# Patient Record
Sex: FEMALE | Race: WHITE | NOT HISPANIC OR LATINO | Employment: FULL TIME | URBAN - METROPOLITAN AREA
[De-identification: names, ages, dates, MRNs, and addresses within clinical notes are randomized per-mention and may not be internally consistent; named-entity substitution may affect disease eponyms.]

---

## 2020-08-07 ENCOUNTER — TRANSCRIBE ORDERS (OUTPATIENT)
Dept: ADMINISTRATIVE | Facility: HOSPITAL | Age: 65
End: 2020-08-07

## 2020-08-07 DIAGNOSIS — Z12.31 SCREENING MAMMOGRAM, ENCOUNTER FOR: Primary | ICD-10-CM

## 2020-08-21 ENCOUNTER — HOSPITAL ENCOUNTER (OUTPATIENT)
Dept: RADIOLOGY | Facility: CLINIC | Age: 65
Discharge: HOME | End: 2020-08-21
Payer: COMMERCIAL

## 2020-08-21 VITALS — WEIGHT: 185 LBS | BODY MASS INDEX: 32.78 KG/M2 | HEIGHT: 63 IN

## 2020-08-21 DIAGNOSIS — Z12.31 SCREENING MAMMOGRAM, ENCOUNTER FOR: ICD-10-CM

## 2020-08-21 PROCEDURE — 77063 BREAST TOMOSYNTHESIS BI: CPT

## 2020-08-21 PROCEDURE — 77067 SCR MAMMO BI INCL CAD: CPT

## 2020-09-09 ENCOUNTER — TRANSCRIBE ORDERS (OUTPATIENT)
Dept: ADMINISTRATIVE | Facility: HOSPITAL | Age: 65
End: 2020-09-09

## 2020-09-09 DIAGNOSIS — R92.8 ABNORMAL MAMMOGRAM: Primary | ICD-10-CM

## 2020-09-16 ENCOUNTER — HOSPITAL ENCOUNTER (OUTPATIENT)
Dept: RADIOLOGY | Facility: CLINIC | Age: 65
Discharge: HOME | End: 2020-09-16
Payer: COMMERCIAL

## 2020-09-16 VITALS — HEIGHT: 63 IN | BODY MASS INDEX: 32.78 KG/M2 | WEIGHT: 185 LBS

## 2020-09-16 DIAGNOSIS — R92.8 ABNORMAL MAMMOGRAM: ICD-10-CM

## 2020-09-16 PROCEDURE — 76642 ULTRASOUND BREAST LIMITED: CPT

## 2020-12-08 ENCOUNTER — NURSE TRIAGE (OUTPATIENT)
Dept: OTHER | Facility: OTHER | Age: 65
End: 2020-12-08

## 2020-12-08 DIAGNOSIS — Z20.828 SARS-ASSOCIATED CORONAVIRUS EXPOSURE: ICD-10-CM

## 2020-12-08 DIAGNOSIS — Z20.828 SARS-ASSOCIATED CORONAVIRUS EXPOSURE: Primary | ICD-10-CM

## 2020-12-08 PROCEDURE — U0003 INFECTIOUS AGENT DETECTION BY NUCLEIC ACID (DNA OR RNA); SEVERE ACUTE RESPIRATORY SYNDROME CORONAVIRUS 2 (SARS-COV-2) (CORONAVIRUS DISEASE [COVID-19]), AMPLIFIED PROBE TECHNIQUE, MAKING USE OF HIGH THROUGHPUT TECHNOLOGIES AS DESCRIBED BY CMS-2020-01-R: HCPCS | Performed by: FAMILY MEDICINE

## 2020-12-10 LAB — SARS-COV-2 RNA SPEC QL NAA+PROBE: NOT DETECTED

## 2021-09-03 ENCOUNTER — HOSPITAL ENCOUNTER (OUTPATIENT)
Dept: RADIOLOGY | Facility: CLINIC | Age: 66
Discharge: HOME | End: 2021-09-03
Payer: COMMERCIAL

## 2021-09-03 VITALS — WEIGHT: 185 LBS | BODY MASS INDEX: 32.78 KG/M2 | HEIGHT: 63 IN

## 2021-09-03 DIAGNOSIS — Z12.31 ENCOUNTER FOR SCREENING MAMMOGRAM FOR MALIGNANT NEOPLASM OF BREAST: ICD-10-CM

## 2021-09-03 PROCEDURE — 77063 BREAST TOMOSYNTHESIS BI: CPT

## 2021-09-03 PROCEDURE — 77067 SCR MAMMO BI INCL CAD: CPT

## 2021-09-29 ENCOUNTER — HOSPITAL ENCOUNTER (OUTPATIENT)
Dept: RADIOLOGY | Facility: CLINIC | Age: 66
Discharge: HOME | End: 2021-09-29
Payer: COMMERCIAL

## 2021-09-29 VITALS — BODY MASS INDEX: 32.78 KG/M2 | HEIGHT: 63 IN | WEIGHT: 185 LBS

## 2021-09-29 DIAGNOSIS — R92.8 ABNORMAL SCREENING MAMMOGRAM: ICD-10-CM

## 2021-09-29 PROCEDURE — 77065 DX MAMMO INCL CAD UNI: CPT

## 2021-09-29 PROCEDURE — G0279 TOMOSYNTHESIS, MAMMO: HCPCS

## 2021-09-29 PROCEDURE — 76642 ULTRASOUND BREAST LIMITED: CPT

## 2021-12-27 ENCOUNTER — HOSPITAL ENCOUNTER (OUTPATIENT)
Dept: RADIOLOGY | Facility: CLINIC | Age: 66
Discharge: HOME | End: 2021-12-27
Payer: COMMERCIAL

## 2021-12-27 DIAGNOSIS — Z13.820 SCREENING FOR OSTEOPOROSIS: ICD-10-CM

## 2021-12-27 DIAGNOSIS — Z78.0 POSTMENOPAUSAL: ICD-10-CM

## 2021-12-27 PROCEDURE — 77080 DXA BONE DENSITY AXIAL: CPT

## 2022-03-23 ENCOUNTER — HOSPITAL ENCOUNTER (OUTPATIENT)
Dept: RADIOLOGY | Facility: CLINIC | Age: 67
Discharge: HOME | End: 2022-03-23
Payer: COMMERCIAL

## 2022-03-23 VITALS — HEIGHT: 63 IN | BODY MASS INDEX: 32.78 KG/M2 | WEIGHT: 185 LBS

## 2022-03-23 DIAGNOSIS — R92.8 OTHER ABNORMAL AND INCONCLUSIVE FINDINGS ON DIAGNOSTIC IMAGING OF BREAST: ICD-10-CM

## 2022-03-23 PROCEDURE — 77066 DX MAMMO INCL CAD BI: CPT

## 2022-03-23 PROCEDURE — 76642 ULTRASOUND BREAST LIMITED: CPT

## 2022-03-23 PROCEDURE — G0279 TOMOSYNTHESIS, MAMMO: HCPCS

## 2022-10-23 ENCOUNTER — HOSPITAL ENCOUNTER (EMERGENCY)
Facility: HOSPITAL | Age: 67
Discharge: HOME | End: 2022-10-23
Attending: STUDENT IN AN ORGANIZED HEALTH CARE EDUCATION/TRAINING PROGRAM
Payer: COMMERCIAL

## 2022-10-23 VITALS
TEMPERATURE: 97.2 F | HEART RATE: 65 BPM | DIASTOLIC BLOOD PRESSURE: 89 MMHG | RESPIRATION RATE: 18 BRPM | OXYGEN SATURATION: 100 % | SYSTOLIC BLOOD PRESSURE: 139 MMHG

## 2022-10-23 DIAGNOSIS — S60.463A INSECT BITE OF LEFT MIDDLE FINGER, INITIAL ENCOUNTER: ICD-10-CM
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
## 2022-10-23 DIAGNOSIS — L03.012 CELLULITIS OF LEFT MIDDLE FINGER: Primary | ICD-10-CM

## 2022-10-23 PROCEDURE — 99284 EMERGENCY DEPT VISIT MOD MDM: CPT | Performed by: STUDENT IN AN ORGANIZED HEALTH CARE EDUCATION/TRAINING PROGRAM

## 2022-10-23 RX ORDER — CEPHALEXIN 250 MG/1
500 CAPSULE ORAL ONCE
Status: COMPLETED | OUTPATIENT
Start: 2022-10-23 | End: 2022-10-23

## 2022-10-23 RX ORDER — CEPHALEXIN 500 MG/1
500 CAPSULE ORAL EVERY 6 HOURS SCHEDULED
Qty: 19 CAPSULE | Refills: 0 | Status: SHIPPED | OUTPATIENT
Start: 2022-10-23 | End: 2022-10-28

## 2022-10-23 RX ADMIN — CEPHALEXIN 500 MG: 250 CAPSULE ORAL at 12:10

## 2022-10-23 NOTE — DISCHARGE INSTRUCTIONS
You are being prescribed a short course of antibiotics for treatment of a soft tissue infection  Please take as directed  In addition, you can take Motrin 600 mg every 6 hours along with oral Benadryl 25 mg every 6-8 hours  You can also apply cool compresses  Follow up with your PCP  Do not hesitate to be re-evaluated in the ED for any concerning signs or symptoms

## 2022-10-23 NOTE — ED PROVIDER NOTES
History  Chief Complaint   Patient presents with   • Insect Bite     Patient stung by bee on Friday  Patient has increased swelling and redness on left middle digit  History provided by:  Patient  Hand Pain  Location:  Left middle finger  Quality:  Pressure  Severity:  Moderate  Onset quality:  Gradual  Duration:  2 days  Timing:  Constant  Progression:  Worsening  Chronicity:  New  Context:  Was stung by an insect  Relieved by:  Nothing  Worsened by:  Nothing  Ineffective treatments:  Antihistamines  Associated symptoms: no fever, no headaches, no myalgias and no rash      30-year-old female  Presents to the ED with increased redness/swelling/pain along the volar aspect of her right middle finger  Was stung on the finger by an insect 48 hours prior  Thus far, took two doses of Benadryl and a dose of Zyrtec without relief  Denies fevers/chills  History reviewed  No pertinent past medical history  History reviewed  No pertinent surgical history  Family History   Problem Relation Age of Onset   • Breast cancer Mother 54   • No Known Problems Father    • No Known Problems Sister    • No Known Problems Maternal Grandmother    • No Known Problems Maternal Grandfather    • No Known Problems Paternal Grandmother    • No Known Problems Paternal Grandfather    • No Known Problems Sister    • No Known Problems Sister    • No Known Problems Sister    • Breast cancer Paternal Aunt    • Breast cancer Paternal Aunt    • BRCA2 Positive Neg Hx    • BRCA1 Positive Neg Hx    • BRCA2 Negative Neg Hx    • BRCA1 Negative Neg Hx    • Ovarian cancer Neg Hx    • Colon cancer Neg Hx    • BRCA 1/2 Neg Hx    • Endometrial cancer Neg Hx    • Breast cancer additional onset Neg Hx      I have reviewed and agree with the history as documented      E-Cigarette/Vaping   • E-Cigarette Use Never User      E-Cigarette/Vaping Substances     Social History     Tobacco Use   • Smoking status: Never Smoker   • Smokeless tobacco: Never Used Vaping Use   • Vaping Use: Never used   Substance Use Topics   • Alcohol use: Yes   • Drug use: Not Currently     Review of Systems   Constitutional: Negative for activity change, appetite change, chills and fever  Musculoskeletal: Positive for joint swelling  Negative for arthralgias, back pain, gait problem and myalgias  Skin: Positive for color change and wound  Negative for pallor and rash  Neurological: Negative for dizziness, syncope, weakness, light-headedness and headaches  Hematological: Does not bruise/bleed easily  All other systems reviewed and are negative  Physical Exam  Physical Exam  Vitals and nursing note reviewed  Constitutional:       General: She is not in acute distress  Appearance: She is not ill-appearing or toxic-appearing  HENT:      Head: Normocephalic and atraumatic  Right Ear: External ear normal       Left Ear: External ear normal    Cardiovascular:      Rate and Rhythm: Normal rate and regular rhythm  Pulses: Normal pulses  Heart sounds: Normal heart sounds  No murmur heard  Pulmonary:      Effort: Pulmonary effort is normal  No respiratory distress  Breath sounds: Normal breath sounds  No stridor  No wheezing, rhonchi or rales  Chest:      Chest wall: No tenderness  Abdominal:      General: Bowel sounds are normal       Tenderness: There is no abdominal tenderness  There is no right CVA tenderness, left CVA tenderness, guarding or rebound  Musculoskeletal:         General: Swelling, tenderness and signs of injury present  Hands:       Comments: Tenderness to palpation along the volar aspect of the left distal 3rd digit  The soft tissue is warm/erythematous/painful to touch  Normal capillary refill  The left hand is neurovascularly intact  No signs of a stinger  Skin:     General: Skin is warm and dry  Capillary Refill: Capillary refill takes less than 2 seconds  Coloration: Skin is not jaundiced or pale  Findings: Erythema present  No bruising, lesion or rash  Neurological:      General: No focal deficit present  Mental Status: She is alert and oriented to person, place, and time  Mental status is at baseline  Cranial Nerves: No cranial nerve deficit  Sensory: No sensory deficit  Motor: No weakness  Psychiatric:         Mood and Affect: Mood normal          Behavior: Behavior normal          Thought Content: Thought content normal          Judgment: Judgment normal          Vital Signs  ED Triage Vitals [10/23/22 1131]   Temperature Pulse Respirations Blood Pressure SpO2   (!) 97 2 °F (36 2 °C) 65 18 139/89 100 %      Temp Source Heart Rate Source Patient Position - Orthostatic VS BP Location FiO2 (%)   Temporal Monitor Sitting Right arm --      Pain Score       --           Vitals:    10/23/22 1131   BP: 139/89   Pulse: 65   Patient Position - Orthostatic VS: Sitting     ED Medications  Medications   cephalexin (KEFLEX) capsule 500 mg (500 mg Oral Given 10/23/22 1210)     Diagnostic Studies  Results Reviewed     None             No orders to display          Procedures  Procedures     ED Course  ED Course as of 10/23/22 1311   Sun Oct 23, 2022   1308 Given the duration and worsening of the patient's symptoms, will prescribe a course of Keflex for treatment of cellulitis  No signs of flexor tenosynovitis  In addition to the abx, OTC medications such as NSAIDS, benadryl recommended  Return precautions discussed and PCP follow up recommended  The patient was stable for discharge        MDM    Disposition  Final diagnoses:   Cellulitis of left middle finger   Insect bite of left middle finger, initial encounter     Time reflects when diagnosis was documented in both MDM as applicable and the Disposition within this note     Time User Action Codes Description Comment    10/23/2022 12:06 PM [facility] Add [H05 [id number]] Cellulitis of left middle finger     10/23/2022 12:06 PM Pastor Burentt Reynaldo Oscar  XXXA] Insect bite of left middle finger, initial encounter       ED Disposition     ED Disposition   Discharge    Condition   Stable    Date/Time   Sun Oct 23, 2022 12:09 PM    Comment   Glory Limb discharge to home/self care  Follow-up Information    None         Discharge Medication List as of 10/23/2022 12:09 PM      START taking these medications    Details   cephalexin (KEFLEX) 500 mg capsule Take 1 capsule (500 mg total) by mouth every 6 (six) hours for 5 days, Starting Sun 10/23/2022, Until Fri 10/28/2022, Normal             No discharge procedures on file      PDMP Review     None          ED Provider  Electronically Signed by           Dora Ward DO  10/23/22 1311

## 2023-03-31 ENCOUNTER — HOSPITAL ENCOUNTER (OUTPATIENT)
Dept: RADIOLOGY | Facility: CLINIC | Age: 68
End: 2023-03-31

## 2023-03-31 VITALS — BODY MASS INDEX: 32.2 KG/M2 | WEIGHT: 175 LBS | HEIGHT: 62 IN

## 2023-03-31 DIAGNOSIS — Z12.31 ENCOUNTER FOR SCREENING MAMMOGRAM FOR MALIGNANT NEOPLASM OF BREAST: ICD-10-CM

## 2023-12-28 ENCOUNTER — HOSPITAL ENCOUNTER (OUTPATIENT)
Dept: RADIOLOGY | Facility: CLINIC | Age: 68
End: 2023-12-28
Payer: COMMERCIAL

## 2023-12-28 DIAGNOSIS — Z78.0 ASYMPTOMATIC MENOPAUSAL STATE: ICD-10-CM

## 2023-12-28 PROCEDURE — 77080 DXA BONE DENSITY AXIAL: CPT

## 2024-01-24 ENCOUNTER — DOCTOR'S OFFICE (OUTPATIENT)
Dept: URBAN - NONMETROPOLITAN AREA CLINIC 1 | Facility: CLINIC | Age: 69
Setting detail: OPHTHALMOLOGY
End: 2024-01-24
Payer: COMMERCIAL

## 2024-01-24 DIAGNOSIS — H34.8120: ICD-10-CM

## 2024-01-24 DIAGNOSIS — H35.372: ICD-10-CM

## 2024-01-24 DIAGNOSIS — S09.90XA: ICD-10-CM

## 2024-01-24 DIAGNOSIS — H25.13: ICD-10-CM

## 2024-01-24 DIAGNOSIS — H43.811: ICD-10-CM

## 2024-01-24 PROCEDURE — 92134 CPTRZ OPH DX IMG PST SGM RTA: CPT | Performed by: OPHTHALMOLOGY

## 2024-01-24 PROCEDURE — 99203 OFFICE O/P NEW LOW 30 MIN: CPT | Performed by: OPHTHALMOLOGY

## 2024-01-24 ASSESSMENT — REFRACTION_AUTOREFRACTION
OD_AXIS: 055
OD_CYLINDER: -0.25
OS_SPHERE: -7.75
OD_SPHERE: -7.25

## 2024-01-24 ASSESSMENT — CONFRONTATIONAL VISUAL FIELD TEST (CVF)
OS_FINDINGS: FULL
OD_FINDINGS: FULL

## 2024-01-24 ASSESSMENT — SPHEQUIV_DERIVED: OD_SPHEQUIV: -7.375

## 2024-04-02 ENCOUNTER — HOSPITAL ENCOUNTER (OUTPATIENT)
Dept: RADIOLOGY | Facility: CLINIC | Age: 69
Discharge: HOME | End: 2024-04-02
Payer: COMMERCIAL

## 2024-04-02 VITALS — HEIGHT: 63 IN | BODY MASS INDEX: 31.01 KG/M2 | WEIGHT: 175 LBS

## 2024-04-02 DIAGNOSIS — Z12.31 ENCOUNTER FOR SCREENING MAMMOGRAM FOR MALIGNANT NEOPLASM OF BREAST: ICD-10-CM

## 2024-04-02 PROCEDURE — 77063 BREAST TOMOSYNTHESIS BI: CPT

## 2024-04-02 PROCEDURE — 77067 SCR MAMMO BI INCL CAD: CPT

## 2025-02-22 ENCOUNTER — HOSPITAL ENCOUNTER (EMERGENCY)
Facility: HOSPITAL | Age: 70
Discharge: HOME | End: 2025-02-22
Attending: EMERGENCY MEDICINE
Payer: MEDICARE

## 2025-02-22 ENCOUNTER — APPOINTMENT (EMERGENCY)
Dept: CT IMAGING | Facility: HOSPITAL | Age: 70
End: 2025-02-22
Payer: MEDICARE

## 2025-02-22 VITALS
DIASTOLIC BLOOD PRESSURE: 63 MMHG | HEART RATE: 64 BPM | HEIGHT: 62 IN | SYSTOLIC BLOOD PRESSURE: 140 MMHG | OXYGEN SATURATION: 96 % | WEIGHT: 175 LBS | RESPIRATION RATE: 17 BRPM | TEMPERATURE: 97.5 F | BODY MASS INDEX: 32.2 KG/M2

## 2025-02-22 DIAGNOSIS — R22.2 SUPRACLAVICULAR FOSSA FULLNESS: Primary | ICD-10-CM

## 2025-02-22 LAB
ALBUMIN SERPL BCG-MCNC: 4.1 G/DL (ref 3.5–5)
ALP SERPL-CCNC: 97 U/L (ref 34–104)
ALT SERPL W P-5'-P-CCNC: 28 U/L (ref 7–52)
ANION GAP SERPL CALCULATED.3IONS-SCNC: 7 MMOL/L (ref 4–13)
AST SERPL W P-5'-P-CCNC: 24 U/L (ref 13–39)
BASOPHILS # BLD AUTO: 0.08 THOUSANDS/ΜL (ref 0–0.1)
BASOPHILS NFR BLD AUTO: 1 % (ref 0–1)
BILIRUB SERPL-MCNC: 0.42 MG/DL (ref 0.2–1)
BUN SERPL-MCNC: 20 MG/DL (ref 5–25)
CALCIUM SERPL-MCNC: 9.6 MG/DL (ref 8.4–10.2)
CHLORIDE SERPL-SCNC: 108 MMOL/L (ref 96–108)
CK SERPL-CCNC: 48 U/L (ref 26–192)
CO2 SERPL-SCNC: 27 MMOL/L (ref 21–32)
CREAT SERPL-MCNC: 0.72 MG/DL (ref 0.6–1.3)
EOSINOPHIL # BLD AUTO: 0.23 THOUSAND/ΜL (ref 0–0.61)
EOSINOPHIL NFR BLD AUTO: 4 % (ref 0–6)
ERYTHROCYTE [DISTWIDTH] IN BLOOD BY AUTOMATED COUNT: 12.6 % (ref 11.6–15.1)
GFR SERPL CREATININE-BSD FRML MDRD: 86 ML/MIN/1.73SQ M
GLUCOSE SERPL-MCNC: 137 MG/DL (ref 65–140)
HCT VFR BLD AUTO: 45.1 % (ref 34.8–46.1)
HGB BLD-MCNC: 14.8 G/DL (ref 11.5–15.4)
IMM GRANULOCYTES # BLD AUTO: 0.01 THOUSAND/UL (ref 0–0.2)
IMM GRANULOCYTES NFR BLD AUTO: 0 % (ref 0–2)
LYMPHOCYTES # BLD AUTO: 2.23 THOUSANDS/ΜL (ref 0.6–4.47)
LYMPHOCYTES NFR BLD AUTO: 38 % (ref 14–44)
MCH RBC QN AUTO: 28.6 PG (ref 26.8–34.3)
MCHC RBC AUTO-ENTMCNC: 32.8 G/DL (ref 31.4–37.4)
MCV RBC AUTO: 87 FL (ref 82–98)
MONOCYTES # BLD AUTO: 0.8 THOUSAND/ΜL (ref 0.17–1.22)
MONOCYTES NFR BLD AUTO: 14 % (ref 4–12)
NEUTROPHILS # BLD AUTO: 2.49 THOUSANDS/ΜL (ref 1.85–7.62)
NEUTS SEG NFR BLD AUTO: 43 % (ref 43–75)
NRBC BLD AUTO-RTO: 0 /100 WBCS
PLATELET # BLD AUTO: 247 THOUSANDS/UL (ref 149–390)
PMV BLD AUTO: 9.4 FL (ref 8.9–12.7)
POTASSIUM SERPL-SCNC: 3.8 MMOL/L (ref 3.5–5.3)
PROT SERPL-MCNC: 6.8 G/DL (ref 6.4–8.4)
RBC # BLD AUTO: 5.17 MILLION/UL (ref 3.81–5.12)
SODIUM SERPL-SCNC: 142 MMOL/L (ref 135–147)
WBC # BLD AUTO: 5.84 THOUSAND/UL (ref 4.31–10.16)

## 2025-02-22 PROCEDURE — 99283 EMERGENCY DEPT VISIT LOW MDM: CPT

## 2025-02-22 PROCEDURE — 36415 COLL VENOUS BLD VENIPUNCTURE: CPT | Performed by: PHYSICIAN ASSISTANT

## 2025-02-22 PROCEDURE — 82550 ASSAY OF CK (CPK): CPT | Performed by: PHYSICIAN ASSISTANT

## 2025-02-22 PROCEDURE — 99284 EMERGENCY DEPT VISIT MOD MDM: CPT | Performed by: PHYSICIAN ASSISTANT

## 2025-02-22 PROCEDURE — 96361 HYDRATE IV INFUSION ADD-ON: CPT

## 2025-02-22 PROCEDURE — 71260 CT THORAX DX C+: CPT

## 2025-02-22 PROCEDURE — 85025 COMPLETE CBC W/AUTO DIFF WBC: CPT | Performed by: PHYSICIAN ASSISTANT

## 2025-02-22 PROCEDURE — 70491 CT SOFT TISSUE NECK W/DYE: CPT

## 2025-02-22 PROCEDURE — 96360 HYDRATION IV INFUSION INIT: CPT

## 2025-02-22 PROCEDURE — 80053 COMPREHEN METABOLIC PANEL: CPT | Performed by: PHYSICIAN ASSISTANT

## 2025-02-22 RX ADMIN — IOHEXOL 85 ML: 350 INJECTION, SOLUTION INTRAVENOUS at 21:50

## 2025-02-22 RX ADMIN — SODIUM CHLORIDE 1000 ML: 0.9 INJECTION, SOLUTION INTRAVENOUS at 20:48

## 2025-02-23 NOTE — ED PROVIDER NOTES
"Time reflects when diagnosis was documented in both MDM as applicable and the Disposition within this note       Time User Action Codes Description Comment    2/22/2025 11:08 PM Aroldo Smith Add [R22.2] Supraclavicular fossa fullness           ED Disposition       ED Disposition   AMA    Condition   --    Date/Time   Sat Feb 22, 2025 11:09 PM    Comment   Date: 2/22/2025  Patient: Antonia Pereira  Admitted: 2/22/2025  8:21 PM  Attending Provider: George Estrada MD    Antonia Pereira or her authorized caregiver has made the decision for the patient to leave the emergency department against th e advice of the emergency department staff. She or her authorized caregiver has been informed and understands the inherent risks, including death, worsening of condition, returning to the ED for further treatment or hospitalization.  She or her autho rized caregiver has decided to accept the responsibility for this decision. Antonia Pereira and all necessary parties have been advised that she may return for further evaluation or treatment. Her condition at time of discharge was alert and orient ed , frustrated about wait times.  Antonia Pereira had current vital signs as follows:  /63 (BP Location: Left arm)   Pulse 64   Temp 97.5 °F (36.4 °C) (Temporal)   Resp 17   Ht 5' 2\" (1.575 m)   Wt 79.4 kg (175 lb)                Assessment & Plan       Medical Decision Making  Patient is a 69-year-old female presents with a complaint of swelling.  She states that she has a swollen area over her left clavicular area.  She noticed it today.  It is slightly tender to palpation.  She states that she had her second shingles vaccine 4 days ago.  She states that she noticed swelling over her left clavicular/neck area today she came in for evaluation.  She did receive her vaccine in her left arm  Does not have any cough congestion fevers chills any nausea vomiting diarrhea or chest pain  Patient did have some " supraclavicular fullness and swelling.  Was slightly tender to palpation.  Patient had full range of motion of neck.  Was not short of breath clear lung sounds.  Lab work was all unremarkable.  Patient waited approximately 3 hours and no CAT scan results were back patient to leave the emergency department.  Patient did sign out AMA due to lack of complete workup performed.  Patient aware of risks.  Patient alert and oriented.  Patient's daughter at bedside brought her here and also was at bedside during the exam will take her home.    Amount and/or Complexity of Data Reviewed  Labs: ordered.  Radiology: ordered.    Risk  Prescription drug management.             Medications   sodium chloride 0.9 % bolus 1,000 mL (0 mL Intravenous Stopped 2/22/25 2231)   iohexol (OMNIPAQUE) 350 MG/ML injection (MULTI-DOSE) 85 mL (85 mL Intravenous Given 2/22/25 2150)       ED Risk Strat Scores                            SBIRT 20yo+      Flowsheet Row Most Recent Value   Initial Alcohol Screen: US AUDIT-C     1. How often do you have a drink containing alcohol? 0 Filed at: 02/22/2025 2024   2. How many drinks containing alcohol do you have on a typical day you are drinking?  0 Filed at: 02/22/2025 2024   3a. Male UNDER 65: How often do you have five or more drinks on one occasion? 0 Filed at: 02/22/2025 2024   3b. FEMALE Any Age, or MALE 65+: How often do you have 4 or more drinks on one occassion? 0 Filed at: 02/22/2025 2024   Audit-C Score 0 Filed at: 02/22/2025 2024   RAE: How many times in the past year have you...    Used an illegal drug or used a prescription medication for non-medical reasons? Never Filed at: 02/22/2025 2024                            History of Present Illness       Chief Complaint   Patient presents with    Medical Problem     Pt noticed lump and swelling to left neck/ shoulder area. Pt reports receiving shingles shot on Wednesday        History reviewed. No pertinent past medical history.   History  reviewed. No pertinent surgical history.   Family History   Problem Relation Age of Onset    Breast cancer Mother 55    No Known Problems Father     No Known Problems Sister     No Known Problems Maternal Grandmother     No Known Problems Maternal Grandfather     No Known Problems Paternal Grandmother     No Known Problems Paternal Grandfather     No Known Problems Sister     No Known Problems Sister     No Known Problems Sister     Breast cancer Paternal Aunt     Breast cancer Paternal Aunt     BRCA2 Positive Neg Hx     BRCA1 Positive Neg Hx     BRCA2 Negative Neg Hx     BRCA1 Negative Neg Hx     Ovarian cancer Neg Hx     Colon cancer Neg Hx     BRCA 1/2 Neg Hx     Endometrial cancer Neg Hx     Breast cancer additional onset Neg Hx       Social History     Tobacco Use    Smoking status: Never    Smokeless tobacco: Never   Vaping Use    Vaping status: Never Used   Substance Use Topics    Alcohol use: Yes    Drug use: Not Currently      E-Cigarette/Vaping    E-Cigarette Use Never User       E-Cigarette/Vaping Substances      I have reviewed and agree with the history as documented.     Patient is a 69-year-old female presents with a complaint of swelling.  She states that she has a swollen area over her left clavicular area.  She noticed it today.  It is slightly tender to palpation.  She states that she had her second shingles vaccine 4 days ago.  She states that she noticed swelling over her left clavicular/neck area today she came in for evaluation.  She did receive her vaccine in her left arm  Does not have any cough congestion fevers chills any nausea vomiting diarrhea or chest pain          Review of Systems   All other systems reviewed and are negative.          Objective       ED Triage Vitals [02/22/25 2025]   Temperature Pulse Blood Pressure Respirations SpO2 Patient Position - Orthostatic VS   97.5 °F (36.4 °C) 79 159/75 16 96 % Sitting      Temp Source Heart Rate Source BP Location FiO2 (%) Pain Score     Temporal Monitor Left arm -- No Pain      Vitals      Date and Time Temp Pulse SpO2 Resp BP Pain Score FACES Pain Rating User   02/22/25 2300 -- 64 96 % 17 140/63 -- --    02/22/25 2030 -- 73 95 % 16 162/77 -- --    02/22/25 2025 97.5 °F (36.4 °C) 79 96 % 16 159/75 No Pain -- AD            Physical Exam  Vitals and nursing note reviewed.   Constitutional:       General: She is not in acute distress.     Appearance: She is well-developed.   HENT:      Head: Normocephalic and atraumatic.      Right Ear: External ear normal.      Left Ear: External ear normal.   Eyes:      Extraocular Movements: Extraocular movements intact.      Pupils: Pupils are equal, round, and reactive to light.   Cardiovascular:      Rate and Rhythm: Normal rate and regular rhythm.      Heart sounds: No murmur heard.  Pulmonary:      Effort: Pulmonary effort is normal. No respiratory distress.      Breath sounds: Normal breath sounds. No wheezing.   Chest:      Chest wall: Swelling present.       Abdominal:      General: Bowel sounds are normal.      Palpations: Abdomen is soft. There is no mass.      Tenderness: There is no abdominal tenderness. There is no rebound.      Hernia: No hernia is present.   Musculoskeletal:      Cervical back: Normal range of motion and neck supple.   Skin:     General: Skin is warm and dry.      Capillary Refill: Capillary refill takes less than 2 seconds.   Neurological:      General: No focal deficit present.      Mental Status: She is alert and oriented to person, place, and time.      Coordination: Coordination normal.   Psychiatric:         Behavior: Behavior normal.         Results Reviewed       Procedure Component Value Units Date/Time    Comprehensive metabolic panel [[id number]] Collected: 02/22/25 2047    Lab Status: Final result Specimen: Blood from Arm, Right Updated: 02/22/25 2108     Sodium 142 mmol/L      Potassium 3.8 mmol/L      Chloride 108 mmol/L      CO2 27 mmol/L      ANION GAP 7 mmol/L       BUN 20 mg/dL      Creatinine 0.72 mg/dL      Glucose 137 mg/dL      Calcium 9.6 mg/dL      AST 24 U/L      ALT 28 U/L      Alkaline Phosphatase 97 U/L      Total Protein 6.8 g/dL      Albumin 4.1 g/dL      Total Bilirubin 0.42 mg/dL      eGFR 86 ml/min/1.73sq m     Narrative:      [employer] guidelines for Chronic Kidney Disease (CKD):     Stage 1 with normal or high GFR (GFR > 90 mL/min/1.73 square meters)    Stage 2 Mild CKD (GFR = 60-89 mL/min/1.73 square meters)    Stage 3A Moderate CKD (GFR = 45-59 mL/min/1.73 square meters)    Stage 3B Moderate CKD (GFR = 30-44 mL/min/1.73 square meters)    Stage 4 Severe CKD (GFR = 15-29 mL/min/1.73 square meters)    Stage 5 End Stage CKD (GFR <15 mL/min/1.73 square meters)  Note: GFR calculation is accurate only with a steady state creatinine    CK [[id number]]  (Normal) Collected: 02/22/25 2047    Lab Status: Final result Specimen: Blood from Arm, Right Updated: 02/22/25 2108     Total CK 48 U/L     CBC and differential [[id number]]  (Abnormal) Collected: 02/22/25 2047    Lab Status: Final result Specimen: Blood from Arm, Right Updated: 02/22/25 2054     WBC 5.84 Thousand/uL      RBC 5.17 Million/uL      Hemoglobin 14.8 g/dL      Hematocrit 45.1 %      MCV 87 fL      MCH 28.6 pg      MCHC 32.8 g/dL      RDW 12.6 %      MPV 9.4 fL      Platelets 247 Thousands/uL      nRBC 0 /100 WBCs      Segmented % 43 %      Immature Grans % 0 %      Lymphocytes % 38 %      Monocytes % 14 %      Eosinophils Relative 4 %      Basophils Relative 1 %      Absolute Neutrophils 2.49 Thousands/µL      Absolute Immature Grans 0.01 Thousand/uL      Absolute Lymphocytes 2.23 Thousands/µL      Absolute Monocytes 0.80 Thousand/µL      Eosinophils Absolute 0.23 Thousand/µL      Basophils Absolute 0.08 Thousands/µL             CT chest with contrast    (Results Pending)   CT soft tissue neck with contrast    (Results Pending)       Procedures    ED Medication and Procedure  Management   None     There are no discharge medications for this patient.    No discharge procedures on file.  ED SEPSIS DOCUMENTATION   Time reflects when diagnosis was documented in both MDM as applicable and the Disposition within this note       Time User Action Codes Description Comment    2/22/2025 11:08 PM Aroldo Smith Add [R22.2] Supraclavicular fossa fullness                  Aroldo Smith PA-C  02/22/25 3022

## 2025-03-07 ENCOUNTER — HOSPITAL ENCOUNTER (OUTPATIENT)
Dept: ULTRASOUND IMAGING | Facility: HOSPITAL | Age: 70
End: 2025-03-07
Payer: MEDICARE

## 2025-03-07 DIAGNOSIS — E04.2 MULTIPLE THYROID NODULES: ICD-10-CM

## 2025-03-07 DIAGNOSIS — R59.0 SUPRACLAVICULAR LYMPHADENOPATHY: ICD-10-CM

## 2025-03-07 PROCEDURE — 76536 US EXAM OF HEAD AND NECK: CPT

## 2025-04-03 ENCOUNTER — HOSPITAL ENCOUNTER (OUTPATIENT)
Dept: RADIOLOGY | Facility: CLINIC | Age: 70
End: 2025-04-03
Payer: MEDICARE

## 2025-04-03 VITALS — WEIGHT: 175 LBS | BODY MASS INDEX: 31.01 KG/M2 | HEIGHT: 63 IN

## 2025-04-03 DIAGNOSIS — Z12.31 ENCOUNTER FOR SCREENING MAMMOGRAM FOR BREAST CANCER: ICD-10-CM

## 2025-04-03 PROCEDURE — 77067 SCR MAMMO BI INCL CAD: CPT

## 2025-04-03 PROCEDURE — 77063 BREAST TOMOSYNTHESIS BI: CPT

## 2025-04-07 DIAGNOSIS — Z12.31 ENCOUNTER FOR SCREENING MAMMOGRAM FOR MALIGNANT NEOPLASM OF BREAST: ICD-10-CM

## 2025-04-10 NOTE — PRE-PROCEDURE INSTRUCTIONS
Call placed to patient to discuss upcoming thyroid bx at [facility]. Allergies reviewed and verified patient does not currently take any anticoagulant medications. Pre-procedure instructions discussed. Patient instructed she may eat normally and take medications as usual before the procedure. Procedure and post procedure expectations and instructions reviewed.   Appointment reminder given: 4/15/25 @ 1030 with 1015 arrival time. Patient verbalized understanding and denied any questions at this time.

## 2025-04-15 ENCOUNTER — HOSPITAL ENCOUNTER (OUTPATIENT)
Dept: ULTRASOUND IMAGING | Facility: HOSPITAL | Age: 70
Discharge: HOME | End: 2025-04-15
Payer: MEDICARE

## 2025-04-15 DIAGNOSIS — E04.2 MULTIPLE THYROID NODULES: ICD-10-CM

## 2025-04-15 PROCEDURE — 10005 FNA BX W/US GDN 1ST LES: CPT

## 2025-04-15 PROCEDURE — 88173 CYTOPATH EVAL FNA REPORT: CPT | Performed by: STUDENT IN AN ORGANIZED HEALTH CARE EDUCATION/TRAINING PROGRAM

## 2025-04-15 RX ORDER — LIDOCAINE HYDROCHLORIDE 10 MG/ML
5 INJECTION, SOLUTION EPIDURAL; INFILTRATION; INTRACAUDAL; PERINEURAL ONCE
Status: COMPLETED | OUTPATIENT
Start: 2025-04-15 | End: 2025-04-15

## 2025-04-15 RX ADMIN — LIDOCAINE HYDROCHLORIDE 5 ML: 10 INJECTION, SOLUTION EPIDURAL; INFILTRATION; INTRACAUDAL; PERINEURAL at 11:07

## 2025-04-17 PROCEDURE — 88173 CYTOPATH EVAL FNA REPORT: CPT | Performed by: STUDENT IN AN ORGANIZED HEALTH CARE EDUCATION/TRAINING PROGRAM

## 2025-06-24 ENCOUNTER — OFFICE VISIT (OUTPATIENT)
Dept: ENDOCRINOLOGY | Facility: CLINIC | Age: 70
End: 2025-06-24
Payer: MEDICARE

## 2025-06-24 VITALS
OXYGEN SATURATION: 97 % | HEART RATE: 69 BPM | HEIGHT: 62 IN | BODY MASS INDEX: 33.68 KG/M2 | TEMPERATURE: 98 F | DIASTOLIC BLOOD PRESSURE: 80 MMHG | WEIGHT: 183 LBS | SYSTOLIC BLOOD PRESSURE: 120 MMHG

## 2025-06-24 DIAGNOSIS — E04.2 MULTINODULAR GOITER: Primary | ICD-10-CM

## 2025-06-24 DIAGNOSIS — R76.8 THYROID ANTIBODY POSITIVE: ICD-10-CM

## 2025-06-24 PROCEDURE — 99204 OFFICE O/P NEW MOD 45 MIN: CPT | Performed by: STUDENT IN AN ORGANIZED HEALTH CARE EDUCATION/TRAINING PROGRAM

## 2025-06-24 RX ORDER — ATORVASTATIN CALCIUM 10 MG/1
10 TABLET, FILM COATED ORAL DAILY
COMMUNITY
Start: 2025-06-10

## 2025-06-24 NOTE — PROGRESS NOTES
Name: Antonia Pereira      : 1955      MRN: [id number]  Encounter Provider: Dar Velez DO  Encounter Date: 2025   Encounter department: [facility] [place]    No chief complaint on file.  :  Assessment & Plan  Multinodular goiter  Multiple thyroid nodules are present, with the dominant nodule in the left lower pole measuring 3.6 cm x 2.2 cm x 3.2 cm, classified as TI-RADS 3. Two right-sided nodules measuring 1.4 cm and 1.2 cm are also TI-RADS 3. Fine needle aspiration (FNA) biopsy returned [place] 3, indicating atypia of undetermined significance. Afirma genomic sequencing  performed was benign, suggesting a low risk of cancer at approximately 4%. Thyroid function tests are within normal limits, with a TSH of 0.66, normal free T4, and free T3 levels. The presence of TPO antibodies at 432 suggests a potential risk for future development of hypothyroidism. Plan to monitor thyroid function periodically to ensure stability. An ultrasound is scheduled for 03/10/2026 to assess the current state of thyroid nodules compared to the previous year. Further evaluation will be considered if there are any changes or concerns.    Follow-up: 2026.    Orders:  •  TSH, 3rd generation; Future  •  T4, free; Future    Thyroid antibody positive  Positive TPO antibody at 432, indicating an autoimmune condition that poses a risk factor for developing hypothyroidism in the future. Current thyroid function is normal, and medication is not required at this time. Risk of developing an underactive thyroid is about 2-4% per year based on the presence of antibodies. Plan to monitor thyroid function periodically, with labs to be checked in six months. Gentle hormone replacement with levothyroxine will be considered if a deficiency develops.    Orders:  •  TSH, 3rd generation; Future  •  T4, free; Future          Pertinent Medical History            History of Present  Illness   History of Present Illness  The patient is a 69-year-old female here today for evaluation of thyroid nodules. She is requesting a second opinion.    She has been following with [facility], including Dr. Ruelas, who is her primary care doctor. She met with [facility] at [facility] in [place]. She was evaluated with a CT neck after presenting to the emergency department with a lump in her neck that she thought could have been related to complications from shingles. Subsequently, nodules affecting her thyroid were identified and evaluated by dedicated thyroid ultrasound. A dominant nodule was noted at over 3 cm. The dominant nodule in the left lower pole measures 3.6 cm x 2.2 cm x 3.2 cm and was designated TI-RADS 3. Additionally, two right-sided nodules measuring 1.4 cm and 1.2 cm were also designated TI-RADS 3. She underwent FNA biopsy on 04/15/2025, targeting the left lower pole dominant nodule, which returned [place] 3 including atypia of undetermined significance. AfJayride[employer]coma genomic sequencing  was performed, which was benign, suggesting a risk of cancer at approximately 4%. During her evaluation, she was found to have a TSH of 0.66 with normal levels of free T4 and free T3, a negative TSH antibody test, and a positive TPO antibody at 432. The reason for checking her antibodies is unclear.    She first noticed swelling in her neck on 02/22/2025, which was confirmed by her daughter-in-law. She sought medical attention at [facility], where a CT scan and blood work were performed. Due to a prolonged wait time of over 3 hours, she decided to leave the hospital. Subsequently, she was contacted and advised to undergo an ultrasound on 03/07/2025, which revealed three nodules, one of which measured 3.6 cm. A fine needle biopsy was then performed, and the results indicated that the nodule was not cancerous. She was informed that her T4 levels were elevated, suggesting a possible diagnosis  "of Hashimoto's disease. Genetic testing was recommended, and after a wait of approximately 3 weeks, she was informed that the results were negative. She was advised to return for a follow-up visit in 1 year, with blood work and another ultrasound scheduled for the month prior to her appointment. She reports that the swelling in her neck has significantly reduced and is no longer noticeable when she looks in the mirror. She did not experience any pain or tenderness associated with the swelling.    Review of Systems as per HPI  Past Medical History   Past Medical History[1]  Past Surgical History[2]  Family History[3]   reports that she has never smoked. She has never used smokeless tobacco. She reports current alcohol use. She reports that she does not currently use drugs.  Current Outpatient Medications   Medication Instructions   • atorvastatin (LIPITOR) 10 mg, Daily   • benzonatate (TESSALON PERLES) 100 mg capsule TAKE 2 CAPSULES BY MOUTH 3 TIMES A DAY AS NEEDED FOR COUGH DO NOT CUT, CRUSH, OR CHEW   Allergies[4]      Medical History Reviewed by provider this encounter:  Tobacco  Allergies  Meds  Problems  Med Hx  Surg Hx  Fam Hx     .    Objective   /80 (BP Location: Left arm, Patient Position: Sitting)   Pulse 69   Temp 98 °F (36.7 °C)   Ht 5' 2\" (1.575 m)   Wt 83 kg (183 lb)   SpO2 97%   BMI 33.47 kg/m²      Body mass index is 33.47 kg/m².  Wt Readings from Last 3 Encounters:   06/24/25 83 kg (183 lb)   04/03/25 79.4 kg (175 lb)   03/28/25 79.4 kg (175 lb)     Physical Exam  Vitals reviewed.   Constitutional:       General: She is not in acute distress.     Appearance: Normal appearance.   HENT:      Head: Normocephalic and atraumatic.     Eyes:      General: No scleral icterus.     Conjunctiva/sclera: Conjunctivae normal.     Neck:      Thyroid: No thyroid mass.   Pulmonary:      Effort: Pulmonary effort is normal. No respiratory distress.     Musculoskeletal:         General: No deformity. " "     Cervical back: Normal range of motion.   Lymphadenopathy:      Cervical: No cervical adenopathy.     Neurological:      General: No focal deficit present.      Mental Status: She is alert.     Psychiatric:         Mood and Affect: Mood normal.         Behavior: Behavior normal.         Labs: I have reviewed pertinent labs including:   Lab Results   Component Value Date    CREATININE 0.72 02/22/2025    CREATININE 0.83 07/02/2024    BUN 20 02/22/2025    K 3.8 02/22/2025     02/22/2025    CO2 27 02/22/2025      eGFRcr   Date Value Ref Range Status   07/02/2024 77 >59 Final     eGFR   Date Value Ref Range Status   02/22/2025 86 ml/min/1.73sq m Final      No results found for: \"[id number]\"       Component  Ref Range & Units (hover) 4/2/25  2:55 PM   TSH 0.66     Component  Ref Range & Units 4/2/25  2:55 PM   TPO AutoAb  <9 IU/mL 432 High          Component  Ref Range & Units (hover) 4/2/25  2:55 PM   FREE T4 0.7             4/15/2025   Case Report Non-gynecologic Cytology                          Case: [id number]                                …    Final Diagnosis A-B. Thyroid, Left lower pole, fine needle aspiration (ThinPrep and smear preparations):…    Note The treating physician has requested a sample(s) from the above thyroid nodule(s) be sent for analysis by…    Intraoperative Consultation B. . Adequate on 3 slides. Alison Forman, [place] [address]    Gross Description A. 20mL light pink, clear, received in [facility]…    Clinical Information abnormal thyroid ultrasound    Additional Information [employer]'s FDA approved ,  and ThinPrep Imaging Duo System are utilized with strict adherence to the 's instruction manual to prepare gynecologic and non-gynecologic cytology specimens for the production of ThinPrep slides as well as for gynecologic ThinPrep imaging. These processes have been validated by our " laboratory and/or by the .…          3.7.25    THYROID ULTRASOUND     INDICATION: E04.2: Nontoxic multinodular goiter  R59.0: Localized enlarged lymph nodes.     COMPARISON: CT neck 2/22/2025     TECHNIQUE: Ultrasound of the thyroid was performed with a high frequency linear transducer in transverse and sagittal planes including volumetric imaging sweeps as well as traditional still imaging technique.     FINDINGS:  Thyroid texture: Normal homogeneous smooth echotexture.     Right lobe: 4.5 x 2.4 x 2.0 cm. Volume 10.1 mL  Left lobe: 4.9 x 3.4 x 2.9 cm. Volume 22.9 mL  Isthmus: 0.8 cm.     Nodule #1. Image 22.  RIGHT midgland nodule measuring 1.2 x 1.1 x 1.2 cm.  COMPOSITION: 2 points, solid or almost completely solid.  ECHOGENICITY: 1 point, hyperechoic or isoechoic.  SHAPE: 0 points, wider-than-tall.  MARGIN: 0 points, smooth.  ECHOGENIC FOCI: 0 points, none or large comet-tail artifacts.  TI-RADS Classification: TR 3 (3 points), FNA if >/= 2.5 cm. Follow if >/= 1.5 cm.     Nodule #2. Image 25.  RIGHT lower pole nodule, near isthmus, measuring 1.4 x 0.8 x 1.0 cm.  COMPOSITION: 2 points, solid or almost completely solid.  ECHOGENICITY: 1 point, hyperechoic or isoechoic.  SHAPE: 0 points, wider-than-tall.  MARGIN: 0 points, smooth.  ECHOGENIC FOCI: 0 points, none or large comet-tail artifacts.  TI-RADS Classification: TR 3 (3 points), FNA if >/= 2.5 cm. Follow if >/= 1.5 cm.     Nodule #3. Image 58.  LEFT lower pole nodule measuring 3.6 x 2.2 x 3.2 cm.  COMPOSITION: 2 points, solid or almost completely solid.  ECHOGENICITY: 1 point, hyperechoic or isoechoic.  SHAPE: 0 points, wider-than-tall.  MARGIN: 0 points, smooth.  ECHOGENIC FOCI: 0 points, none or large comet-tail artifacts.  TI-RADS Classification: TR 3 (3 points), FNA if >/= 2.5 cm. Follow if >/= 1.5 cm.     ADDITIONAL FINDINGS: Several normal-appearing left supraclavicular lymph nodes.     IMPRESSION:        1. Several thyroid nodules, 3 of which  are described in detail above. The following meets current ACR criteria for recommending ultrasound guided biopsy:     The 3.6 cm left lower pole nodule. (Image number 58) (CRITERIA: TR 3, Mildly suspicious. FNA if >2.5 cm.     2. There are other subcentimeter nodules do not require biopsy or follow-up.          There are no Patient Instructions on file for this visit.    Discussed with the patient and all questioned fully answered. She will call me if any problems arise.             [1]  No past medical history on file.[2]  Past Surgical History:  Procedure Laterality Date   • US GUIDED THYROID BIOPSY  4/15/2025   [3]  Family History  Problem Relation Name Age of Onset   • Breast cancer Mother  55   • No Known Problems Father     • No Known Problems Sister     • No Known Problems Sister     • No Known Problems Sister     • No Known Problems Sister     • No Known Problems Maternal Grandmother     • No Known Problems Maternal Grandfather     • No Known Problems Paternal Grandmother     • No Known Problems Paternal Grandfather     • Breast cancer Paternal Aunt     • Breast cancer Paternal Aunt     • BRCA2 Positive Neg Hx     • BRCA1 Positive Neg Hx     • BRCA2 Negative Neg Hx     • BRCA1 Negative Neg Hx     • Ovarian cancer Neg Hx     • Colon cancer Neg Hx     • BRCA 1/2 Neg Hx     • Endometrial cancer Neg Hx     • Breast cancer additional onset Neg Hx     [4]  Allergies  Allergen Reactions   • Penicillins Rash

## 2025-06-24 NOTE — LETTER
2025     Rhoda Ruelas MD  [address] [facility] [address]    Patient: Antonia Pereira   YOB: 1955   Date of Visit: 2025       Dear Dr. Rhoda Ruelas MD:    Thank you for referring Antonia Pereira to me for evaluation. Below are my notes for this consultation.    If you have questions, please do not hesitate to call me. I look forward to following your patient along with you.         Sincerely,        Dar Velez DO        CC: No Recipients    Dar Russell Velez DO  2025  2:47 PM  Incomplete  Name: Antonia Pereira      : 1955      MRN: [id number]  Encounter Provider: Dar Velez DO  Encounter Date: 2025   Encounter department: [facility]    No chief complaint on file.  :  Assessment & Plan  Multinodular goiter  Multiple thyroid nodules are present, with the dominant nodule in the left lower pole measuring 3.6 cm x 2.2 cm x 3.2 cm, classified as TI-RADS 3. Two right-sided nodules measuring 1.4 cm and 1.2 cm are also TI-RADS 3. Fine needle aspiration (FNA) biopsy returned [place] 3, indicating atypia of undetermined significance. Afirma genomic sequencing  performed was benign, suggesting a low risk of cancer at approximately 4%. Thyroid function tests are within normal limits, with a TSH of 0.66, normal free T4, and free T3 levels. The presence of TPO antibodies at 432 suggests a potential risk for future development of hypothyroidism. Plan to monitor thyroid function periodically to ensure stability. An ultrasound is scheduled for 03/10/2026 to assess the current state of thyroid nodules compared to the previous year. Further evaluation will be considered if there are any changes or concerns.    Follow-up: 2026.    Orders:  •  TSH, 3rd generation; Future  •  T4, free; Future    Thyroid antibody positive  Positive TPO antibody at 432, indicating an autoimmune condition that  poses a risk factor for developing hypothyroidism in the future. Current thyroid function is normal, and medication is not required at this time. Risk of developing an underactive thyroid is about 2-4% per year based on the presence of antibodies. Plan to monitor thyroid function periodically, with labs to be checked in six months. Gentle hormone replacement with levothyroxine will be considered if a deficiency develops.    Orders:  •  TSH, 3rd generation; Future  •  T4, free; Future          Pertinent Medical History           History of Present Illness  History of Present Illness  The patient is a 69-year-old female here today for evaluation of thyroid nodules. She is requesting a second opinion.    She has been following with [facility], including Dr. Ruelas, who is her primary care doctor. She met with [facility] at [facility] in [place]. She was evaluated with a CT neck after presenting to the emergency department with a lump in her neck that she thought could have been related to complications from shingles. Subsequently, nodules affecting her thyroid were identified and evaluated by dedicated thyroid ultrasound. A dominant nodule was noted at over 3 cm. The dominant nodule in the left lower pole measures 3.6 cm x 2.2 cm x 3.2 cm and was designated TI-RADS 3. Additionally, two right-sided nodules measuring 1.4 cm and 1.2 cm were also designated TI-RADS 3. She underwent FNA biopsy on 04/15/2025, targeting the left lower pole dominant nodule, which returned [place] 3 including atypia of undetermined significance. Afirma genomic sequencing  was performed, which was benign, suggesting a risk of cancer at approximately 4%. During her evaluation, she was found to have a TSH of 0.66 with normal levels of free T4 and free T3, a negative TSH antibody test, and a positive TPO antibody at 432. The reason for checking her antibodies is unclear.    She first noticed swelling in her neck on  "02/22/2025, which was confirmed by her daughter-in-law. She sought medical attention at [facility], where a CT scan and blood work were performed. Due to a prolonged wait time of over 3 hours, she decided to leave the hospital. Subsequently, she was contacted and advised to undergo an ultrasound on 03/07/2025, which revealed three nodules, one of which measured 3.6 cm. A fine needle biopsy was then performed, and the results indicated that the nodule was not cancerous. She was informed that her T4 levels were elevated, suggesting a possible diagnosis of Hashimoto's disease. Genetic testing was recommended, and after a wait of approximately 3 weeks, she was informed that the results were negative. She was advised to return for a follow-up visit in 1 year, with blood work and another ultrasound scheduled for the month prior to her appointment. She reports that the swelling in her neck has significantly reduced and is no longer noticeable when she looks in the mirror. She did not experience any pain or tenderness associated with the swelling.    Review of Systems as per HPI       Medical History Reviewed by provider this encounter:  Tobacco  Allergies  Meds  Problems  Med Hx  Surg Hx  Fam Hx     .    Objective  /80 (BP Location: Left arm, Patient Position: Sitting)   Pulse 69   Temp 98 °F (36.7 °C)   Ht 5' 2\" (1.575 m)   Wt 83 kg (183 lb)   SpO2 97%   BMI 33.47 kg/m²      Body mass index is 33.47 kg/m².  Wt Readings from Last 3 Encounters:   06/24/25 83 kg (183 lb)   04/03/25 79.4 kg (175 lb)   03/28/25 79.4 kg (175 lb)     Physical Exam  Vitals reviewed.   Constitutional:       General: She is not in acute distress.     Appearance: Normal appearance.   HENT:      Head: Normocephalic and atraumatic.     Eyes:      General: No scleral icterus.     Conjunctiva/sclera: Conjunctivae normal.     Neck:      Thyroid: No thyroid mass.   Pulmonary:      Effort: Pulmonary effort is normal. No respiratory " "distress.     Musculoskeletal:         General: No deformity.      Cervical back: Normal range of motion.   Lymphadenopathy:      Cervical: No cervical adenopathy.     Neurological:      General: No focal deficit present.      Mental Status: She is alert.     Psychiatric:         Mood and Affect: Mood normal.         Behavior: Behavior normal.         Labs: I have reviewed pertinent labs including:   Lab Results   Component Value Date    CREATININE 0.72 02/22/2025    CREATININE 0.83 07/02/2024    BUN 20 02/22/2025    K 3.8 02/22/2025     02/22/2025    CO2 27 02/22/2025      eGFRcr   Date Value Ref Range Status   07/02/2024 77 >59 Final     eGFR   Date Value Ref Range Status   02/22/2025 86 ml/min/1.73sq m Final      No results found for: \"[id number]\"       3.7.25    THYROID ULTRASOUND     INDICATION: E04.2: Nontoxic multinodular goiter  R59.0: Localized enlarged lymph nodes.     COMPARISON: CT neck 2/22/2025     TECHNIQUE: Ultrasound of the thyroid was performed with a high frequency linear transducer in transverse and sagittal planes including volumetric imaging sweeps as well as traditional still imaging technique.     FINDINGS:  Thyroid texture: Normal homogeneous smooth echotexture.     Right lobe: 4.5 x 2.4 x 2.0 cm. Volume 10.1 mL  Left lobe: 4.9 x 3.4 x 2.9 cm. Volume 22.9 mL  Isthmus: 0.8 cm.     Nodule #1. Image 22.  RIGHT midgland nodule measuring 1.2 x 1.1 x 1.2 cm.  COMPOSITION: 2 points, solid or almost completely solid.  ECHOGENICITY: 1 point, hyperechoic or isoechoic.  SHAPE: 0 points, wider-than-tall.  MARGIN: 0 points, smooth.  ECHOGENIC FOCI: 0 points, none or large comet-tail artifacts.  TI-RADS Classification: TR 3 (3 points), FNA if >/= 2.5 cm. Follow if >/= 1.5 cm.     Nodule #2. Image 25.  RIGHT lower pole nodule, near isthmus, measuring 1.4 x 0.8 x 1.0 cm.  COMPOSITION: 2 points, solid or almost completely solid.  ECHOGENICITY: 1 point, hyperechoic or isoechoic.  SHAPE: 0 points, " wider-than-tall.  MARGIN: 0 points, smooth.  ECHOGENIC FOCI: 0 points, none or large comet-tail artifacts.  TI-RADS Classification: TR 3 (3 points), FNA if >/= 2.5 cm. Follow if >/= 1.5 cm.     Nodule #3. Image 58.  LEFT lower pole nodule measuring 3.6 x 2.2 x 3.2 cm.  COMPOSITION: 2 points, solid or almost completely solid.  ECHOGENICITY: 1 point, hyperechoic or isoechoic.  SHAPE: 0 points, wider-than-tall.  MARGIN: 0 points, smooth.  ECHOGENIC FOCI: 0 points, none or large comet-tail artifacts.  TI-RADS Classification: TR 3 (3 points), FNA if >/= 2.5 cm. Follow if >/= 1.5 cm.     ADDITIONAL FINDINGS: Several normal-appearing left supraclavicular lymph nodes.     IMPRESSION:        1. Several thyroid nodules, 3 of which are described in detail above. The following meets current ACR criteria for recommending ultrasound guided biopsy:     The 3.6 cm left lower pole nodule. (Image number 58) (CRITERIA: TR 3, Mildly suspicious. FNA if >2.5 cm.     2. There are other subcentimeter nodules do not require biopsy or follow-up.          There are no Patient Instructions on file for this visit.    Discussed with the patient and all questioned fully answered. She will call me if any problems arise.        Dar Velez DO  2025  2:44 PM  Sign when Signing Visit  Name: Antonia Pereira      : 1955      MRN: [id number]  Encounter Provider: Dar Velez DO  Encounter Date: 2025   Encounter department: [facility] [place]    No chief complaint on file.  :  Assessment & Plan  Multinodular goiter    Orders:  •  TSH, 3rd generation; Future  •  T4, free; Future    Thyroid antibody positive    Orders:  •  TSH, 3rd generation; Future  •  T4, free; Future          Pertinent Medical History          History of Present Illness  History of Present Illness      Review of Systems as per HPI       Medical History Reviewed by provider this encounter:  Tobacco  Allergies   "Meds  Problems  Med Hx  Surg Hx  Fam Hx     .    Objective  /80 (BP Location: Left arm, Patient Position: Sitting)   Pulse 69   Temp 98 °F (36.7 °C)   Ht 5' 2\" (1.575 m)   Wt 83 kg (183 lb)   SpO2 97%   BMI 33.47 kg/m²      Body mass index is 33.47 kg/m².  Wt Readings from Last 3 Encounters:   06/24/25 83 kg (183 lb)   04/03/25 79.4 kg (175 lb)   03/28/25 79.4 kg (175 lb)     Physical Exam    Labs: I have reviewed pertinent labs including:   Lab Results   Component Value Date    CREATININE 0.72 02/22/2025    CREATININE 0.83 07/02/2024    BUN 20 02/22/2025    K 3.8 02/22/2025     02/22/2025    CO2 27 02/22/2025      eGFRcr   Date Value Ref Range Status   07/02/2024 77 >59 Final     eGFR   Date Value Ref Range Status   02/22/2025 86 ml/min/1.73sq m Final      No results found for: \"[id number]\"       3.7.25    THYROID ULTRASOUND     INDICATION: E04.2: Nontoxic multinodular goiter  R59.0: Localized enlarged lymph nodes.     COMPARISON: CT neck 2/22/2025     TECHNIQUE: Ultrasound of the thyroid was performed with a high frequency linear transducer in transverse and sagittal planes including volumetric imaging sweeps as well as traditional still imaging technique.     FINDINGS:  Thyroid texture: Normal homogeneous smooth echotexture.     Right lobe: 4.5 x 2.4 x 2.0 cm. Volume 10.1 mL  Left lobe: 4.9 x 3.4 x 2.9 cm. Volume 22.9 mL  Isthmus: 0.8 cm.     Nodule #1. Image 22.  RIGHT midgland nodule measuring 1.2 x 1.1 x 1.2 cm.  COMPOSITION: 2 points, solid or almost completely solid.  ECHOGENICITY: 1 point, hyperechoic or isoechoic.  SHAPE: 0 points, wider-than-tall.  MARGIN: 0 points, smooth.  ECHOGENIC FOCI: 0 points, none or large comet-tail artifacts.  TI-RADS Classification: TR 3 (3 points), FNA if >/= 2.5 cm. Follow if >/= 1.5 cm.     Nodule #2. Image 25.  RIGHT lower pole nodule, near isthmus, measuring 1.4 x 0.8 x 1.0 cm.  COMPOSITION: 2 points, solid or almost completely " solid.  ECHOGENICITY: 1 point, hyperechoic or isoechoic.  SHAPE: 0 points, wider-than-tall.  MARGIN: 0 points, smooth.  ECHOGENIC FOCI: 0 points, none or large comet-tail artifacts.  TI-RADS Classification: TR 3 (3 points), FNA if >/= 2.5 cm. Follow if >/= 1.5 cm.     Nodule #3. Image 58.  LEFT lower pole nodule measuring 3.6 x 2.2 x 3.2 cm.  COMPOSITION: 2 points, solid or almost completely solid.  ECHOGENICITY: 1 point, hyperechoic or isoechoic.  SHAPE: 0 points, wider-than-tall.  MARGIN: 0 points, smooth.  ECHOGENIC FOCI: 0 points, none or large comet-tail artifacts.  TI-RADS Classification: TR 3 (3 points), FNA if >/= 2.5 cm. Follow if >/= 1.5 cm.     ADDITIONAL FINDINGS: Several normal-appearing left supraclavicular lymph nodes.     IMPRESSION:        1. Several thyroid nodules, 3 of which are described in detail above. The following meets current ACR criteria for recommending ultrasound guided biopsy:     The 3.6 cm left lower pole nodule. (Image number 58) (CRITERIA: TR 3, Mildly suspicious. FNA if >2.5 cm.     2. There are other subcentimeter nodules do not require biopsy or follow-up.          There are no Patient Instructions on file for this visit.    Discussed with the patient and all questioned fully answered. She will call me if any problems arise.